# Patient Record
Sex: MALE | Race: WHITE
[De-identification: names, ages, dates, MRNs, and addresses within clinical notes are randomized per-mention and may not be internally consistent; named-entity substitution may affect disease eponyms.]

---

## 2020-06-20 ENCOUNTER — HOSPITAL ENCOUNTER (EMERGENCY)
Dept: HOSPITAL 62 - ER | Age: 25
Discharge: HOME | End: 2020-06-20
Payer: OTHER GOVERNMENT

## 2020-06-20 VITALS — DIASTOLIC BLOOD PRESSURE: 76 MMHG | SYSTOLIC BLOOD PRESSURE: 155 MMHG
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
## 2020-06-20 DIAGNOSIS — S05.02XA: Primary | ICD-10-CM

## 2020-06-20 DIAGNOSIS — H57.12: ICD-10-CM

## 2020-06-20 DIAGNOSIS — T26.42XA: ICD-10-CM

## 2020-06-20 PROCEDURE — 99283 EMERGENCY DEPT VISIT LOW MDM: CPT
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
